# Patient Record
Sex: FEMALE
[De-identification: names, ages, dates, MRNs, and addresses within clinical notes are randomized per-mention and may not be internally consistent; named-entity substitution may affect disease eponyms.]

---

## 2023-10-17 ENCOUNTER — RESULT REVIEW (OUTPATIENT)
Age: 23
End: 2023-10-17

## 2023-10-17 ENCOUNTER — APPOINTMENT (OUTPATIENT)
Dept: RADIOLOGY | Facility: CLINIC | Age: 23
End: 2023-10-17

## 2024-04-24 ENCOUNTER — APPOINTMENT (OUTPATIENT)
Dept: ORTHOPEDIC SURGERY | Facility: CLINIC | Age: 24
End: 2024-04-24
Payer: COMMERCIAL

## 2024-04-24 VITALS — WEIGHT: 130 LBS | BODY MASS INDEX: 19.7 KG/M2 | HEIGHT: 68 IN

## 2024-04-24 DIAGNOSIS — M25.311 OTHER INSTABILITY, RIGHT SHOULDER: ICD-10-CM

## 2024-04-24 PROBLEM — Z00.00 ENCOUNTER FOR PREVENTIVE HEALTH EXAMINATION: Status: ACTIVE | Noted: 2024-04-24

## 2024-04-24 PROCEDURE — 73030 X-RAY EXAM OF SHOULDER: CPT | Mod: RT

## 2024-04-24 PROCEDURE — 99203 OFFICE O/P NEW LOW 30 MIN: CPT

## 2024-04-25 NOTE — ASSESSMENT
[FreeTextEntry1] : Discussed at length with patient imaging symptoms as well as history of particular chronic instability and subluxation episodes recommendation this time would be for MRI evaluation given the documented instability chronically with a consideration possibly to surgical stabilization

## 2024-04-25 NOTE — PHYSICAL EXAM
[de-identified] : Right Shoulder: Constitutional: The patient is healthy-appearing and in no apparent distress.   Cardiovascular System:  The capillary refill is less than 2 seconds.   Skin:  There are no skin abnormalities.  C-Spine/Neck:  Active Range of Motion: Flexion				50 Extension			60 Lateral rotation			80    Right Shoulder:  Inspection:  There is no atrophy, erythema, warmth, swelling. There is no scapular winging. There is no AC prominence.   Bony Palpation:  There is no tenderness of the clavicle. There is no tenderness of the acromioclavicular joint. There is no tenderness of the greater tuberosity.  There is no tenderness of the bicipital groove.   Soft Tissue Palpation:  There is no tenderness of the trapezius. There is no tenderness of the rhomboid. There is no tenderness of the subacromial bursa.   Active Range of Motion:  Forward flexion- 				180  Abduction-					150 External rotation at 0 degrees abduction-	80  Internal rotation at 0 degrees abduction-	80  Passive Range of Motion:  Forward flexion- 			180  Abduction-				150 External rotation at 0 deg abduction-	80  Internal rotation at 0 deg abduction-	80  Special Tests:  Hawkin's  				Positive  Neer's  				Positive  Speed's  				Negative AC cross-over 			            Negative Lorain's  				Negative  Stability:  There is no general laxity.  There is anterior apprehension. There is a positive relocation relief sign  Strength:  Supraspinatus abduction 		5/5 External rotation at 0 deg abduction 	5/5 Internal rotation at 0 deg abduction	5/5 Scapular elevation 			5/5   Neurological System:   There is normal sensation to light touch C5-T1.   Stability:  There is no general laxity.   Psychiatric:  The patient demonstrates a normal mood and affect and is active and alert.  [de-identified] : X-ray right shoulder: There is no significant bony / soft tissue abnormality, arthritis, or fracture except for a clear small Hill-Sachs deformity

## 2024-04-25 NOTE — HISTORY OF PRESENT ILLNESS
[de-identified] : Initial Visit:  Right Shoulder pain  Reason: dislocated  Duration: 1 week  Prior studies: x rays  Aggravating Fx: overhead movements / pushing / pulling  Alleviating Fx: resting   Medical Hx: none  Surgery Hx: none  Pain Med: Advil Current Med: none  Allergies: Peanuts  Completed several series of physical therapy - 7 years ago  Patient reports she has had multiple subluxation episodes and dislocations over the last 7 years

## 2025-01-22 ENCOUNTER — NON-APPOINTMENT (OUTPATIENT)
Age: 25
End: 2025-01-22

## 2025-01-22 ENCOUNTER — APPOINTMENT (OUTPATIENT)
Dept: OTOLARYNGOLOGY | Facility: CLINIC | Age: 25
End: 2025-01-22
Payer: COMMERCIAL

## 2025-01-22 VITALS
DIASTOLIC BLOOD PRESSURE: 81 MMHG | SYSTOLIC BLOOD PRESSURE: 123 MMHG | OXYGEN SATURATION: 97 % | HEART RATE: 77 BPM | TEMPERATURE: 98.4 F | HEIGHT: 67 IN | WEIGHT: 125 LBS | BODY MASS INDEX: 19.62 KG/M2

## 2025-01-22 DIAGNOSIS — Z78.9 OTHER SPECIFIED HEALTH STATUS: ICD-10-CM

## 2025-01-22 PROCEDURE — 99203 OFFICE O/P NEW LOW 30 MIN: CPT

## 2025-01-22 PROCEDURE — 92557 COMPREHENSIVE HEARING TEST: CPT

## 2025-01-22 PROCEDURE — 92550 TYMPANOMETRY & REFLEX THRESH: CPT | Mod: 52

## 2025-04-27 ENCOUNTER — NON-APPOINTMENT (OUTPATIENT)
Age: 25
End: 2025-04-27